# Patient Record
Sex: FEMALE | Race: WHITE | NOT HISPANIC OR LATINO | URBAN - METROPOLITAN AREA
[De-identification: names, ages, dates, MRNs, and addresses within clinical notes are randomized per-mention and may not be internally consistent; named-entity substitution may affect disease eponyms.]

---

## 2022-12-07 ENCOUNTER — OFFICE VISIT (OUTPATIENT)
Dept: URBAN - METROPOLITAN AREA CLINIC 72 | Facility: CLINIC | Age: 62
End: 2022-12-07
Payer: COMMERCIAL

## 2022-12-07 ENCOUNTER — WEB ENCOUNTER (OUTPATIENT)
Dept: URBAN - METROPOLITAN AREA CLINIC 72 | Facility: CLINIC | Age: 62
End: 2022-12-07

## 2022-12-07 ENCOUNTER — LAB OUTSIDE AN ENCOUNTER (OUTPATIENT)
Dept: URBAN - METROPOLITAN AREA CLINIC 72 | Facility: CLINIC | Age: 62
End: 2022-12-07

## 2022-12-07 VITALS
BODY MASS INDEX: 30.39 KG/M2 | WEIGHT: 178 LBS | HEART RATE: 87 BPM | SYSTOLIC BLOOD PRESSURE: 139 MMHG | DIASTOLIC BLOOD PRESSURE: 87 MMHG | TEMPERATURE: 98.5 F | HEIGHT: 64 IN

## 2022-12-07 DIAGNOSIS — Z12.11 COLON CANCER SCREENING: ICD-10-CM

## 2022-12-07 DIAGNOSIS — R10.13 DYSPEPSIA: ICD-10-CM

## 2022-12-07 DIAGNOSIS — K21.9 GASTROESOPHAGEAL REFLUX DISEASE, UNSPECIFIED WHETHER ESOPHAGITIS PRESENT: ICD-10-CM

## 2022-12-07 PROCEDURE — 99204 OFFICE O/P NEW MOD 45 MIN: CPT | Performed by: INTERNAL MEDICINE

## 2022-12-07 RX ORDER — IBUPROFEN 800 MG/1
TABLET, FILM COATED ORAL
Qty: 50 TABLET | Status: ON HOLD | COMMUNITY

## 2022-12-07 RX ORDER — CLOBETASOL PROPIONATE 0.5 MG/G
APPLY A THIN LAYER EXTERNALLY TO THE AFFECTED AREA TWICE DAILY FOR 7 DAYS CREAM TOPICAL
Qty: 60 GRAM | Refills: 0 | Status: ON HOLD | COMMUNITY

## 2022-12-07 RX ORDER — FLUOXETINE 20 MG/1
TAKE 1 CAPSULE BY MOUTH ONCE DAILY CAPSULE ORAL
Qty: 30 EACH | Refills: 0 | Status: ON HOLD | COMMUNITY

## 2022-12-07 RX ORDER — LISINOPRIL 20 MG/1
TABLET ORAL
Qty: 30 TABLET | Status: ACTIVE | COMMUNITY

## 2022-12-07 RX ORDER — HYDROCODONE BITARTRATE AND ACETAMINOPHEN 5; 325 MG/1; MG/1
TABLET ORAL
Qty: 12 TABLET | Status: ON HOLD | COMMUNITY

## 2022-12-07 RX ORDER — PREDNISONE 20 MG/1
TAKE 2 TABLETS BY MOUTH ONCE DAILY FOR 10 DAYS TABLET ORAL
Qty: 20 EACH | Refills: 0 | Status: ON HOLD | COMMUNITY

## 2022-12-07 RX ORDER — MIRABEGRON 25 MG/1
TABLET, FILM COATED, EXTENDED RELEASE ORAL
Qty: 30 TABLET | Status: ACTIVE | COMMUNITY

## 2022-12-07 RX ORDER — OMEPRAZOLE MAGNESIUM, AMOXICILLIN AND RIFABUTIN 10; 250; 12.5 MG/1; MG/1; MG/1
CAPSULE, DELAYED RELEASE ORAL
Qty: 60 CAPSULE | Status: DISCONTINUED | COMMUNITY

## 2022-12-07 RX ORDER — CELECOXIB 200 MG/1
CAPSULE ORAL
Qty: 2 CAPSULE | Status: ON HOLD | COMMUNITY

## 2022-12-07 RX ORDER — METHOCARBAMOL TABLETS 750 MG/1
TAKE ONE TABLET BY MOUTH 1 HOUR PRIOR TO SURGICAL PROCEDURE TABLET, COATED ORAL
Qty: 1 EACH | Refills: 0 | Status: ON HOLD | COMMUNITY

## 2022-12-07 RX ORDER — CYCLOBENZAPRINE HYDROCHLORIDE 10 MG/1
TAKE 1 TO 2 TABLETS BY MOUTH ONCE DAILY AS NEEDED FOR BACK PAIN TABLET, FILM COATED ORAL
Qty: 30 EACH | Refills: 0 | Status: ON HOLD | COMMUNITY

## 2022-12-07 RX ORDER — OXYCODONE HYDROCHLORIDE 5 MG/1
TAKE 1 TABLET BY MOUTH THREE TIMES DAILY AS NEEDED FOR SEVERE PAIN TABLET ORAL
Qty: 21 EACH | Refills: 0 | Status: ON HOLD | COMMUNITY

## 2022-12-07 RX ORDER — PREGABALIN 150 MG/1
CAPSULE ORAL
Qty: 2 CAPSULE | Status: ON HOLD | COMMUNITY

## 2022-12-07 RX ORDER — OMEPRAZOLE 40 MG/1
1 CAPSULE 30 MINUTES BEFORE MORNING MEAL CAPSULE, DELAYED RELEASE ORAL ONCE A DAY
Qty: 30 | Refills: 0 | OUTPATIENT
Start: 2022-12-07

## 2022-12-07 RX ORDER — SULFAMETHOXAZOLE AND TRIMETHOPRIM 800; 160 MG/1; MG/1
TAKE 1 TABLET BY MOUTH EVERY 12 HOURS TABLET ORAL
Qty: 10 EACH | Refills: 0 | Status: ON HOLD | COMMUNITY

## 2022-12-07 RX ORDER — FLUCONAZOLE 150 MG/1
TABLET ORAL
Qty: 1 TABLET | Status: ON HOLD | COMMUNITY

## 2022-12-07 NOTE — HPI-TODAY'S VISIT:
Mrs. Lewis is a pleasant 62-year-old who presents as a new patient for evaluation for heartburn, bloating and stomach pain.  A CT scan of the abdomen pelvis with contrast 11/17/2022 done for generalized abdominal pain revealed benign hepatic cysts mild colonic diverticulosis and a right lower quadrant retroperitoneal hematoma.  Of note this was done after she was hospitalized for cardiac catheterization.  Lab work at that time revealed a WBC of 7.1, hemoglobin 13.5, MCV of 94, platelet count 211, sodium 139, creatinine 0.6, alkaline phosphatase 33, ALT 21, AST 24, bilirubin 1.5  She has been seen by another local gastroenterologist who performed testing and told her she had H. pylori and she underwent treatment of this.  She reports that she had a positive H. pylori blood test.  She was treated with Milena which she completed.  She reports that she has had issues with her stomach for most of her adult life.  She had diverticulitis when she was young.  She has never had a colonoscopy.  Her typical symptoms include indigestion, heartburn and discomfort.  She is a vegetarian diet.  She notes that anything that she eats can cause her to feel bloated and uncomfortable.  She has been utilizing omeprazole 20 mg daily over-the-counter.  She denies having an upper endoscopy.

## 2022-12-07 NOTE — EXAM-PHYSICAL EXAM
General--no acute distress, resting comfortably Eyes--anicteric, no pallor HENT--normocephalic, atraumatic head Neck--no lymphadenopathy, non tender Chest--non labored breathing, equal rise Abdomen--soft, non tender, non distended, no organomegaly Neuro--alert and oriented, answers appropriately

## 2022-12-19 ENCOUNTER — TELEPHONE ENCOUNTER (OUTPATIENT)
Dept: URBAN - METROPOLITAN AREA CLINIC 72 | Facility: CLINIC | Age: 62
End: 2022-12-19

## 2022-12-20 ENCOUNTER — TELEPHONE ENCOUNTER (OUTPATIENT)
Dept: URBAN - METROPOLITAN AREA CLINIC 72 | Facility: CLINIC | Age: 62
End: 2022-12-20

## 2022-12-23 ENCOUNTER — OFFICE VISIT (OUTPATIENT)
Dept: URBAN - METROPOLITAN AREA MEDICAL CENTER 40 | Facility: MEDICAL CENTER | Age: 62
End: 2022-12-23
Payer: COMMERCIAL

## 2022-12-23 DIAGNOSIS — R13.19 DYSPHAGIA: ICD-10-CM

## 2022-12-23 DIAGNOSIS — K21.9 GASTROESOPHAGEAL REFLUX DISEASE, UNSPECIFIED WHETHER ESOPHAGITIS PRESENT: ICD-10-CM

## 2022-12-23 DIAGNOSIS — Z12.11 COLON CANCER SCREENING: ICD-10-CM

## 2022-12-23 DIAGNOSIS — K22.10 EROSIVE ESOPHAGITIS: ICD-10-CM

## 2022-12-23 PROCEDURE — 43239 EGD BIOPSY SINGLE/MULTIPLE: CPT | Performed by: INTERNAL MEDICINE

## 2022-12-23 PROCEDURE — G0121 COLON CA SCRN NOT HI RSK IND: HCPCS | Performed by: INTERNAL MEDICINE

## 2022-12-23 PROCEDURE — 43248 EGD GUIDE WIRE INSERTION: CPT | Performed by: INTERNAL MEDICINE

## 2023-01-03 PROBLEM — 40719004 EROSIVE ESOPHAGITIS: Status: ACTIVE | Noted: 2023-01-03

## 2023-01-03 PROBLEM — 235595009: Status: ACTIVE | Noted: 2022-12-07

## 2023-01-04 ENCOUNTER — TELEPHONE ENCOUNTER (OUTPATIENT)
Dept: URBAN - METROPOLITAN AREA CLINIC 72 | Facility: CLINIC | Age: 63
End: 2023-01-04

## 2023-01-04 RX ORDER — OMEPRAZOLE 40 MG/1
1 CAPSULE 30 MINUTES BEFORE MORNING MEAL CAPSULE, DELAYED RELEASE ORAL ONCE A DAY
Qty: 90 | Refills: 0
Start: 2022-12-07

## 2023-01-10 ENCOUNTER — OFFICE VISIT (OUTPATIENT)
Dept: URBAN - METROPOLITAN AREA CLINIC 72 | Facility: CLINIC | Age: 63
End: 2023-01-10
Payer: COMMERCIAL

## 2023-01-10 ENCOUNTER — DASHBOARD ENCOUNTERS (OUTPATIENT)
Age: 63
End: 2023-01-10

## 2023-01-10 VITALS
TEMPERATURE: 98.4 F | WEIGHT: 171 LBS | HEART RATE: 71 BPM | BODY MASS INDEX: 29.19 KG/M2 | DIASTOLIC BLOOD PRESSURE: 84 MMHG | HEIGHT: 64 IN | SYSTOLIC BLOOD PRESSURE: 129 MMHG

## 2023-01-10 DIAGNOSIS — K57.30 ACQUIRED DIVERTICULOSIS OF COLON: ICD-10-CM

## 2023-01-10 DIAGNOSIS — K20.90 ESOPHAGITIS: ICD-10-CM

## 2023-01-10 PROBLEM — 397881000: Status: ACTIVE | Noted: 2023-01-10

## 2023-01-10 PROCEDURE — 99213 OFFICE O/P EST LOW 20 MIN: CPT | Performed by: INTERNAL MEDICINE

## 2023-01-10 RX ORDER — CYCLOBENZAPRINE HYDROCHLORIDE 10 MG/1
TAKE 1 TO 2 TABLETS BY MOUTH ONCE DAILY AS NEEDED FOR BACK PAIN TABLET, FILM COATED ORAL
Qty: 30 EACH | Refills: 0 | Status: ON HOLD | COMMUNITY

## 2023-01-10 RX ORDER — LISINOPRIL 20 MG/1
TABLET ORAL
Qty: 30 TABLET | Status: ACTIVE | COMMUNITY

## 2023-01-10 RX ORDER — OXYCODONE HYDROCHLORIDE 5 MG/1
TAKE 1 TABLET BY MOUTH THREE TIMES DAILY AS NEEDED FOR SEVERE PAIN TABLET ORAL
Qty: 21 EACH | Refills: 0 | Status: ON HOLD | COMMUNITY

## 2023-01-10 RX ORDER — PREGABALIN 150 MG/1
CAPSULE ORAL
Qty: 2 CAPSULE | Status: ON HOLD | COMMUNITY

## 2023-01-10 RX ORDER — HYDROCODONE BITARTRATE AND ACETAMINOPHEN 5; 325 MG/1; MG/1
TABLET ORAL
Qty: 12 TABLET | Status: ON HOLD | COMMUNITY

## 2023-01-10 RX ORDER — SULFAMETHOXAZOLE AND TRIMETHOPRIM 800; 160 MG/1; MG/1
TAKE 1 TABLET BY MOUTH EVERY 12 HOURS TABLET ORAL
Qty: 10 EACH | Refills: 0 | Status: ON HOLD | COMMUNITY

## 2023-01-10 RX ORDER — IBUPROFEN 800 MG/1
TABLET, FILM COATED ORAL
Qty: 50 TABLET | Status: ON HOLD | COMMUNITY

## 2023-01-10 RX ORDER — CLOBETASOL PROPIONATE 0.5 MG/G
APPLY A THIN LAYER EXTERNALLY TO THE AFFECTED AREA TWICE DAILY FOR 7 DAYS CREAM TOPICAL
Qty: 60 GRAM | Refills: 0 | Status: ON HOLD | COMMUNITY

## 2023-01-10 RX ORDER — METHOCARBAMOL TABLETS 750 MG/1
TAKE ONE TABLET BY MOUTH 1 HOUR PRIOR TO SURGICAL PROCEDURE TABLET, COATED ORAL
Qty: 1 EACH | Refills: 0 | Status: ON HOLD | COMMUNITY

## 2023-01-10 RX ORDER — OMEPRAZOLE 40 MG/1
1 CAPSULE 30 MINUTES BEFORE MORNING MEAL CAPSULE, DELAYED RELEASE ORAL ONCE A DAY
Qty: 90 | Refills: 0 | Status: ACTIVE | COMMUNITY
Start: 2022-12-07

## 2023-01-10 RX ORDER — MIRABEGRON 25 MG/1
TABLET, FILM COATED, EXTENDED RELEASE ORAL
Qty: 30 TABLET | Status: ACTIVE | COMMUNITY

## 2023-01-10 RX ORDER — FLUCONAZOLE 150 MG/1
TABLET ORAL
Qty: 1 TABLET | Status: ON HOLD | COMMUNITY

## 2023-01-10 RX ORDER — FLUOXETINE 20 MG/1
TAKE 1 CAPSULE BY MOUTH ONCE DAILY CAPSULE ORAL
Qty: 30 EACH | Refills: 0 | Status: ON HOLD | COMMUNITY

## 2023-01-10 RX ORDER — PREDNISONE 20 MG/1
TAKE 2 TABLETS BY MOUTH ONCE DAILY FOR 10 DAYS TABLET ORAL
Qty: 20 EACH | Refills: 0 | Status: ON HOLD | COMMUNITY

## 2023-01-10 RX ORDER — CELECOXIB 200 MG/1
CAPSULE ORAL
Qty: 2 CAPSULE | Status: ON HOLD | COMMUNITY

## 2023-01-10 NOTE — HPI-TODAY'S VISIT:
Pleasant 62-year-old female here for EGD/colonoscopy follow-up.  EGD/colonoscopy 12/23/2022.  EGD was normal.  Empiric dilatation performed with guidewire to 17 mm.  Colonoscopy: Scant left-sided diverticular disease colonoscopy otherwise normal.  Path: Stomach biopsies normal negative for H. pylori.  Esophageal biopsy revealed erosive esophagitis no Garcia's.  Was recommended she continue PPI therapy.  A CT scan of the abdomen pelvis with contrast 11/17/2022 done for generalized abdominal pain revealed benign hepatic cysts mild colonic diverticulosis and a right lower quadrant retroperitoneal hematoma.  Of note this was done after she was hospitalized for cardiac catheterization.  Lab work at that time revealed a WBC of 7.1, hemoglobin 13.5, MCV of 94, platelet count 211, sodium 139, creatinine 0.6, alkaline phosphatase 33, ALT 21, AST 24, bilirubin 1.5  On interview is doing well.  She denies, nausea, vomiting or dysphagia.  No abdominal pain.  She is frustrated she had to drive here for this appointment when she already received the results.  She would like referral to a dietitian.